# Patient Record
Sex: FEMALE | Race: WHITE | NOT HISPANIC OR LATINO | Employment: OTHER | URBAN - METROPOLITAN AREA
[De-identification: names, ages, dates, MRNs, and addresses within clinical notes are randomized per-mention and may not be internally consistent; named-entity substitution may affect disease eponyms.]

---

## 2017-03-10 ENCOUNTER — TELEPHONE (OUTPATIENT)
Dept: CARDIOLOGY | Facility: MEDICAL CENTER | Age: 67
End: 2017-03-10

## 2017-03-10 NOTE — TELEPHONE ENCOUNTER
Received letter from [facility] stating that patient is receiving refills for Atorvastatin and Pravastatin.    Called patient, she states that she is only taking Atorvastatin.    Called [facility] and verified that Pravastatin prescription was discontinued.    SANTOS MONTILLA

## 2017-06-01 ENCOUNTER — OFFICE VISIT (OUTPATIENT)
Dept: CARDIOLOGY | Facility: CLINIC | Age: 67
End: 2017-06-01
Payer: MEDICARE

## 2017-06-01 VITALS
WEIGHT: 80 LBS | HEIGHT: 63 IN | SYSTOLIC BLOOD PRESSURE: 90 MMHG | BODY MASS INDEX: 14.18 KG/M2 | DIASTOLIC BLOOD PRESSURE: 60 MMHG | HEART RATE: 89 BPM | OXYGEN SATURATION: 89 %

## 2017-06-01 DIAGNOSIS — I25.5 ISCHEMIC CARDIOMYOPATHY: Chronic | ICD-10-CM

## 2017-06-01 DIAGNOSIS — R06.02 SHORTNESS OF BREATH: ICD-10-CM

## 2017-06-01 DIAGNOSIS — I25.84 CORONARY ARTERY DISEASE DUE TO CALCIFIED CORONARY LESION: ICD-10-CM

## 2017-06-01 DIAGNOSIS — E78.5 DYSLIPIDEMIA: Chronic | ICD-10-CM

## 2017-06-01 DIAGNOSIS — I25.10 CORONARY ARTERY DISEASE DUE TO CALCIFIED CORONARY LESION: ICD-10-CM

## 2017-06-01 PROCEDURE — G8419 CALC BMI OUT NRM PARAM NOF/U: HCPCS | Performed by: INTERNAL MEDICINE

## 2017-06-01 PROCEDURE — 3014F SCREEN MAMMO DOC REV: CPT | Mod: 8P | Performed by: INTERNAL MEDICINE

## 2017-06-01 PROCEDURE — 1036F TOBACCO NON-USER: CPT | Performed by: INTERNAL MEDICINE

## 2017-06-01 PROCEDURE — 1101F PT FALLS ASSESS-DOCD LE1/YR: CPT | Mod: 8P | Performed by: INTERNAL MEDICINE

## 2017-06-01 PROCEDURE — 4040F PNEUMOC VAC/ADMIN/RCVD: CPT | Mod: 8P | Performed by: INTERNAL MEDICINE

## 2017-06-01 PROCEDURE — 3017F COLORECTAL CA SCREEN DOC REV: CPT | Mod: 8P | Performed by: INTERNAL MEDICINE

## 2017-06-01 PROCEDURE — G8432 DEP SCR NOT DOC, RNG: HCPCS | Performed by: INTERNAL MEDICINE

## 2017-06-01 PROCEDURE — G8598 ASA/ANTIPLAT THER USED: HCPCS | Performed by: INTERNAL MEDICINE

## 2017-06-01 PROCEDURE — 99214 OFFICE O/P EST MOD 30 MIN: CPT | Performed by: INTERNAL MEDICINE

## 2017-06-01 NOTE — Clinical Note
[facility] [address] [place] [address]  Phone: [phone]  Fax: [phone]              Aditi Merlos  1950    Encounter Date: 6/1/2017    Darnell Browning M.D.          PROGRESS NOTE:  Subjective:   Aditi Merlos is a 66 y.o. female who presents today for followup of her coronary disease with an ischemic cardiomyopathy which is mild. She did have a prior myocardial infarction with subsequent percutaneous intervention to the diagonal. She also has hyperlipidemia. She previously smoked but has quit this habit. However, she is exposed to significant secondhand smoke.    She has been under significant life stress and has not been walking regularly. She was previously able to walk for 15-20 minutes without dyspnea, she was walking uphill. She now has dyspnea on exertion at about a half a block.    She has noted no PND, orthopnea or edema. She's had no chest discomfort, palpitations or lightheadedness.      Past Medical History   Diagnosis Date   • Ischemic cardiomyopathy    • Anterior myocardial infarction (CMS-[facility])    • COPD (chronic obstructive pulmonary disease) (CMS-[facility])    • Dyslipidemia    • Illiteracy    • Tobacco abuse    • CAD (coronary artery disease) 3/27/2013     HX PTCA OF DIAGONAL     Past Surgical History   Procedure Laterality Date   • Appendectomy       Family History   Problem Relation Age of Onset   • Heart Disease Father      History   Smoking status   • Former Smoker -- 3.00 packs/day for 40 years   • Types: Cigarettes   • Quit date: 06/23/2008   Smokeless tobacco   • Never Used     No Known Allergies  Outpatient Encounter Prescriptions as of 6/1/2017   Medication Sig Dispense Refill   • Albuterol Sulfate (PROAIR HFA INH) Inhale  by mouth.     • metoprolol SR (TOPROL XL) 50 MG TABLET SR 24 HR Take 1.5 Tabs by mouth every day. 45 Tab 11   • lisinopril (PRINIVIL) 20 MG Tab Take 1 Tab by mouth every day. 30 Tab 11    "  • atorvastatin (LIPITOR) 40 MG Tab take 1 tablet by mouth once daily 30 Tab 11   • alendronate (FOSAMAX) 70 MG Tab Take  by mouth.  0   • hydrocodone-acetaminophen (NORCO) 5-325 MG TABS per tablet Take 1-2 Tabs by mouth as needed.     • aspirin (ASA) 325 MG TABS Take 325 mg by mouth every day. Take one aspirin daily        No facility-administered encounter medications on file as of 6/1/2017.     ROS       Objective:   BP 90/60 mmHg  Pulse 89  Ht 1.6 m (5' 2.99\")  Wt 36.288 kg (80 lb)  BMI 14.18 kg/m2  SpO2 89%    Physical Exam   Neck: No JVD present.   Cardiovascular: Regular rhythm.  Exam reveals no gallop.    No murmur heard.  Pulmonary/Chest: Effort normal. She has no rales.   Abdominal: Soft. There is no tenderness.   Musculoskeletal: She exhibits no edema.     Laboratory from June 13: CMP is within normal limits. Total cholesterol 145, HDL 64, LDL 64 and triglycerides 85.    Echocardiogram from November 20:    This shows normal overall systolic left ventricular function with an ejection fraction of approximately 55%. There was akinesis of the apical anterior and apical lateral and apical walls. Grade 1 diastolic dysfunction was normal. Patient's estimated respiratory systolic pressure was moderately elevated at 45 mmHg.    Assessment:     1. Coronary artery disease due to calcified coronary lesion     2. Dyslipidemia     3. Ischemic cardiomyopathy         Medical Decision Making:  Today's Assessment / Status / Plan:     Ms. Merlos Is having difficulty with dyspnea. She's had a significant fall in her exercise tolerance. I feel she should be evaluated with a BNP. In addition, I would like her to get back into a regular walking program. Her lipid status has been under good control. She will have a repeat lipid and CMP with her BNP. She will follow-up in a few months.      DIAMOND Jose.  [address] [place] [address]  VIA Facsimile: [phone]                   "

## 2017-06-01 NOTE — PROGRESS NOTES
Subjective:   Aditi Merlos is a 66 y.o. female who presents today for followup of her coronary disease with an ischemic cardiomyopathy which is mild. She did have a prior myocardial infarction with subsequent percutaneous intervention to the diagonal. She also has hyperlipidemia. She previously smoked but has quit this habit. However, she is exposed to significant secondhand smoke.    She has been under significant life stress and has not been walking regularly. She was previously able to walk for 15-20 minutes without dyspnea, she was walking uphill. She now has dyspnea on exertion at about a half a block.    She has noted no PND, orthopnea or edema. She's had no chest discomfort, palpitations or lightheadedness.      Past Medical History   Diagnosis Date   • Ischemic cardiomyopathy    • Anterior myocardial infarction (CMS-HCC)    • COPD (chronic obstructive pulmonary disease) (CMS-[facility])    • Dyslipidemia    • Illiteracy    • Tobacco abuse    • CAD (coronary artery disease) 3/27/2013     HX PTCA OF DIAGONAL     Past Surgical History   Procedure Laterality Date   • Appendectomy       Family History   Problem Relation Age of Onset   • Heart Disease Father      History   Smoking status   • Former Smoker -- 3.00 packs/day for 40 years   • Types: Cigarettes   • Quit date: 06/23/2008   Smokeless tobacco   • Never Used     No Known Allergies  Outpatient Encounter Prescriptions as of 6/1/2017   Medication Sig Dispense Refill   • Albuterol Sulfate (PROAIR HFA INH) Inhale  by mouth.     • metoprolol SR (TOPROL XL) 50 MG TABLET SR 24 HR Take 1.5 Tabs by mouth every day. 45 Tab 11   • lisinopril (PRINIVIL) 20 MG Tab Take 1 Tab by mouth every day. 30 Tab 11   • atorvastatin (LIPITOR) 40 MG Tab take 1 tablet by mouth once daily 30 Tab 11   • alendronate (FOSAMAX) 70 MG Tab Take  by mouth.  0   • hydrocodone-acetaminophen (NORCO) 5-325 MG TABS per tablet Take 1-2 Tabs by mouth as needed.     • aspirin (ASA) 325 MG TABS Take 325  "mg by mouth every day. Take one aspirin daily        No facility-administered encounter medications on file as of 6/1/2017.     ROS       Objective:   BP 90/60 mmHg  Pulse 89  Ht 1.6 m (5' 2.99\")  Wt 36.288 kg (80 lb)  BMI 14.18 kg/m2  SpO2 89%    Physical Exam   Neck: No JVD present.   Cardiovascular: Regular rhythm.  Exam reveals no gallop.    No murmur heard.  Pulmonary/Chest: Effort normal. She has no rales.   Abdominal: Soft. There is no tenderness.   Musculoskeletal: She exhibits no edema.     Laboratory from June 13: CMP is within normal limits. Total cholesterol 145, HDL 64, LDL 64 and triglycerides 85.    Echocardiogram from November 20:    This shows normal overall systolic left ventricular function with an ejection fraction of approximately 55%. There was akinesis of the apical anterior and apical lateral and apical walls. Grade 1 diastolic dysfunction was normal. Patient's estimated respiratory systolic pressure was moderately elevated at 45 mmHg.    Assessment:     1. Coronary artery disease due to calcified coronary lesion     2. Dyslipidemia     3. Ischemic cardiomyopathy         Medical Decision Making:  Today's Assessment / Status / Plan:     Ms. Merlos Is having difficulty with dyspnea. She's had a significant fall in her exercise tolerance. I feel she should be evaluated with a BNP. In addition, I would like her to get back into a regular walking program. Her lipid status has been under good control. She will have a repeat lipid and CMP with her BNP. She will follow-up in a few months.  "

## 2017-06-07 DIAGNOSIS — E78.5 DYSLIPIDEMIA: Chronic | ICD-10-CM

## 2017-06-07 DIAGNOSIS — R06.02 SHORTNESS OF BREATH: ICD-10-CM

## 2017-06-07 DIAGNOSIS — I25.84 CORONARY ARTERY DISEASE DUE TO CALCIFIED CORONARY LESION: ICD-10-CM

## 2017-06-07 DIAGNOSIS — I25.10 CORONARY ARTERY DISEASE DUE TO CALCIFIED CORONARY LESION: ICD-10-CM

## 2017-10-16 DIAGNOSIS — E78.5 DYSLIPIDEMIA: ICD-10-CM

## 2017-10-16 RX ORDER — ATORVASTATIN CALCIUM 40 MG/1
40 TABLET, FILM COATED ORAL DAILY
Qty: 90 TAB | Refills: 2 | OUTPATIENT
Start: 2017-10-16 | End: 2018-07-03 | Stop reason: SDUPTHER

## 2017-10-26 ENCOUNTER — OFFICE VISIT (OUTPATIENT)
Dept: CARDIOLOGY | Facility: CLINIC | Age: 67
End: 2017-10-26
Payer: MEDICARE

## 2017-10-26 VITALS
WEIGHT: 88 LBS | SYSTOLIC BLOOD PRESSURE: 132 MMHG | BODY MASS INDEX: 16.2 KG/M2 | OXYGEN SATURATION: 81 % | HEART RATE: 88 BPM | HEIGHT: 62 IN | DIASTOLIC BLOOD PRESSURE: 70 MMHG

## 2017-10-26 DIAGNOSIS — R06.09 DYSPNEA ON EXERTION: ICD-10-CM

## 2017-10-26 DIAGNOSIS — I25.10 CORONARY ARTERY DISEASE DUE TO CALCIFIED CORONARY LESION: ICD-10-CM

## 2017-10-26 DIAGNOSIS — I25.5 ISCHEMIC CARDIOMYOPATHY: Chronic | ICD-10-CM

## 2017-10-26 DIAGNOSIS — E78.5 DYSLIPIDEMIA: Chronic | ICD-10-CM

## 2017-10-26 DIAGNOSIS — I25.84 CORONARY ARTERY DISEASE DUE TO CALCIFIED CORONARY LESION: ICD-10-CM

## 2017-10-26 PROCEDURE — 99214 OFFICE O/P EST MOD 30 MIN: CPT | Performed by: INTERNAL MEDICINE

## 2017-10-26 RX ORDER — FUROSEMIDE 20 MG/1
20 TABLET ORAL 2 TIMES DAILY
Qty: 60 TAB | Refills: 11 | Status: SHIPPED | OUTPATIENT
Start: 2017-10-26

## 2017-10-26 RX ORDER — POTASSIUM CHLORIDE 20 MEQ/1
20 TABLET, EXTENDED RELEASE ORAL 2 TIMES DAILY
Qty: 60 TAB | Refills: 11 | Status: SHIPPED | OUTPATIENT
Start: 2017-10-26
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
## 2017-11-09 ENCOUNTER — OFFICE VISIT (OUTPATIENT)
Dept: CARDIOLOGY | Facility: CLINIC | Age: 67
End: 2017-11-09
Payer: MEDICARE

## 2017-11-09 VITALS
SYSTOLIC BLOOD PRESSURE: 110 MMHG | BODY MASS INDEX: 15.09 KG/M2 | WEIGHT: 82 LBS | HEART RATE: 86 BPM | HEIGHT: 62 IN | DIASTOLIC BLOOD PRESSURE: 62 MMHG

## 2017-11-09 DIAGNOSIS — J43.9 PULMONARY EMPHYSEMA, UNSPECIFIED EMPHYSEMA TYPE (HCC): ICD-10-CM

## 2017-11-09 DIAGNOSIS — I27.81 COR PULMONALE (HCC): ICD-10-CM

## 2017-11-09 DIAGNOSIS — I25.10 CORONARY ARTERY DISEASE DUE TO CALCIFIED CORONARY LESION: ICD-10-CM

## 2017-11-09 DIAGNOSIS — I25.5 ISCHEMIC CARDIOMYOPATHY: Chronic | ICD-10-CM

## 2017-11-09 DIAGNOSIS — R06.09 DYSPNEA ON EXERTION: ICD-10-CM

## 2017-11-09 DIAGNOSIS — I25.84 CORONARY ARTERY DISEASE DUE TO CALCIFIED CORONARY LESION: ICD-10-CM

## 2017-11-09 PROCEDURE — 99214 OFFICE O/P EST MOD 30 MIN: CPT | Performed by: INTERNAL MEDICINE

## 2017-11-09 ASSESSMENT — ENCOUNTER SYMPTOMS
WHEEZING: 1
SHORTNESS OF BREATH: 1

## 2017-11-09 NOTE — LETTER
[facility] [address] [place] [address]  Phone: [phone]  Fax: [phone]              Aditi Merlos  1950    Encounter Date: 11/9/2017    Ian Guzman M.D.          PROGRESS NOTE:  Subjective:   Aditi Merlos is a 67 y.o. female who presents today In follow-up for congestive heart failure  Chest x-ray: COPD only  Echo: LVEF 55 anterior wall motion abnormality  PA pressure 60, previously 45    CMP:K equal 3.4    Past Medical History:   Diagnosis Date   • Anterior myocardial infarction (CMS-[facility])    • CAD (coronary artery disease) 3/27/2013    HX PTCA OF DIAGONAL   • COPD (chronic obstructive pulmonary disease) (CMS-[facility])    • Dyslipidemia    • Illiteracy    • Ischemic cardiomyopathy    • Tobacco abuse      Past Surgical History:   Procedure Laterality Date   • APPENDECTOMY       Family History   Problem Relation Age of Onset   • Heart Disease Father      History   Smoking Status   • Former Smoker   • Packs/day: 3.00   • Years: 40.00   • Types: Cigarettes   • Quit date: 6/23/2008   Smokeless Tobacco   • Never Used     No Known Allergies  Outpatient Encounter Prescriptions as of 11/9/2017   Medication Sig Dispense Refill   • ipratropium-albuterol (COMBIVENT RESPIMAT)  MCG/ACT Aero Soln Inhale 1 Puff by mouth 4 times a day.     • furosemide (LASIX) 20 MG Tab Take 1 Tab by mouth 2 times a day. 60 Tab 11   • potassium chloride SA (KDUR) 20 MEQ Tab CR Take 1 Tab by mouth 2 times a day. 60 Tab 11   • atorvastatin (LIPITOR) 40 MG Tab Take 1 Tab by mouth every day. 90 Tab 2   • Albuterol Sulfate (PROAIR HFA INH) Inhale  by mouth.     • metoprolol SR (TOPROL XL) 50 MG TABLET SR 24 HR Take 1.5 Tabs by mouth every day. 45 Tab 11   • lisinopril (PRINIVIL) 20 MG Tab Take 1 Tab by mouth every day. 30 Tab 11   • alendronate (FOSAMAX) 70 MG Tab Take  by mouth.  0   • hydrocodone-acetaminophen (NORCO) 5-325 MG TABS per tablet Take  "1-2 Tabs by mouth as needed.     • aspirin (ASA) 325 MG TABS Take 325 mg by mouth every day. Take one aspirin daily        No facility-administered encounter medications on file as of 11/9/2017.      Review of Systems   Respiratory: Positive for shortness of breath and wheezing.    Cardiovascular: Positive for leg swelling.        Objective:   /62   Pulse 86   Ht 1.575 m (5' 2\")   Wt 37.2 kg (82 lb)   BMI 15.00 kg/m²      Physical Exam   Constitutional: She is oriented to person, place, and time.   Neck: No JVD present.   Cardiovascular: Normal rate, regular rhythm and normal heart sounds.  Exam reveals no gallop and no friction rub.    No murmur heard.  Pulmonary/Chest:   Distant breath sounds bilateral  mild tachypnea   Musculoskeletal: She exhibits edema.   Trace pedal edema   Neurological: She is alert and oriented to person, place, and time.   Skin: Skin is warm and dry.       Assessment:     1. Ischemic cardiomyopathy  DX-CHEST-2 VIEWS    BTYPE NATRIURETIC PEPTIDE    BASIC METABOLIC PANEL   2. Dyspnea on exertion  DX-CHEST-2 VIEWS    BTYPE NATRIURETIC PEPTIDE    BASIC METABOLIC PANEL    CBC WITH DIFFERENTIAL   3. Coronary artery disease due to calcified coronary lesion  BASIC METABOLIC PANEL   4. Pulmonary emphysema, unspecified emphysema type (CMS-HCC)     5. Cor pulmonale (CMS-[facility])         Medical Decision Making:  Today's Assessment / Status / Plan:   Cor pulmonale secondary to COPD  Compensated with Lasix  Very likely needs oxygen 24/7  No med changes  Return 6 months      Bren Elaine, PDillonA.  [address] [place] [address]  VIA Facsimile: [phone]                 "

## 2017-11-09 NOTE — PROGRESS NOTES
Subjective:   Aditi Merlos is a 67 y.o. female who presents today In follow-up for congestive heart failure  Chest x-ray: COPD only  Echo: LVEF 55 anterior wall motion abnormality  PA pressure 60, previously 45    CMP:K equal 3.4    Past Medical History:   Diagnosis Date   • Anterior myocardial infarction (CMS-HCC)    • CAD (coronary artery disease) 3/27/2013    HX PTCA OF DIAGONAL   • COPD (chronic obstructive pulmonary disease) (CMS-HCC)    • Dyslipidemia    • Illiteracy    • Ischemic cardiomyopathy    • Tobacco abuse      Past Surgical History:   Procedure Laterality Date   • APPENDECTOMY       Family History   Problem Relation Age of Onset   • Heart Disease Father      History   Smoking Status   • Former Smoker   • Packs/day: 3.00   • Years: 40.00   • Types: Cigarettes   • Quit date: 6/23/2008   Smokeless Tobacco   • Never Used     No Known Allergies  Outpatient Encounter Prescriptions as of 11/9/2017   Medication Sig Dispense Refill   • ipratropium-albuterol (COMBIVENT RESPIMAT)  MCG/ACT Aero Soln Inhale 1 Puff by mouth 4 times a day.     • furosemide (LASIX) 20 MG Tab Take 1 Tab by mouth 2 times a day. 60 Tab 11   • potassium chloride SA (KDUR) 20 MEQ Tab CR Take 1 Tab by mouth 2 times a day. 60 Tab 11   • atorvastatin (LIPITOR) 40 MG Tab Take 1 Tab by mouth every day. 90 Tab 2   • Albuterol Sulfate (PROAIR HFA INH) Inhale  by mouth.     • metoprolol SR (TOPROL XL) 50 MG TABLET SR 24 HR Take 1.5 Tabs by mouth every day. 45 Tab 11   • lisinopril (PRINIVIL) 20 MG Tab Take 1 Tab by mouth every day. 30 Tab 11   • alendronate (FOSAMAX) 70 MG Tab Take  by mouth.  0   • hydrocodone-acetaminophen (NORCO) 5-325 MG TABS per tablet Take 1-2 Tabs by mouth as needed.     • aspirin (ASA) 325 MG TABS Take 325 mg by mouth every day. Take one aspirin daily        No facility-administered encounter medications on file as of 11/9/2017.      Review of Systems   Respiratory: Positive for shortness of breath and  "wheezing.    Cardiovascular: Positive for leg swelling.        Objective:   /62   Pulse 86   Ht 1.575 m (5' 2\")   Wt 37.2 kg (82 lb)   BMI 15.00 kg/m²     Physical Exam   Constitutional: She is oriented to person, place, and time.   Neck: No JVD present.   Cardiovascular: Normal rate, regular rhythm and normal heart sounds.  Exam reveals no gallop and no friction rub.    No murmur heard.  Pulmonary/Chest:   Distant breath sounds bilateral  mild tachypnea   Musculoskeletal: She exhibits edema.   Trace pedal edema   Neurological: She is alert and oriented to person, place, and time.   Skin: Skin is warm and dry.       Assessment:     1. Ischemic cardiomyopathy  DX-CHEST-2 VIEWS    BTYPE NATRIURETIC PEPTIDE    BASIC METABOLIC PANEL   2. Dyspnea on exertion  DX-CHEST-2 VIEWS    BTYPE NATRIURETIC PEPTIDE    BASIC METABOLIC PANEL    CBC WITH DIFFERENTIAL   3. Coronary artery disease due to calcified coronary lesion  BASIC METABOLIC PANEL   4. Pulmonary emphysema, unspecified emphysema type (CMS-HCC)     5. Cor pulmonale (CMS-[facility])         Medical Decision Making:  Today's Assessment / Status / Plan:   Cor pulmonale secondary to COPD  Compensated with Lasix  Very likely needs oxygen 24/7  No med changes  Return 6 months  "

## 2017-11-17 DIAGNOSIS — I25.5 ISCHEMIC CARDIOMYOPATHY: Chronic | ICD-10-CM

## 2017-11-18 RX ORDER — LISINOPRIL 20 MG/1
TABLET ORAL
Qty: 30 TAB | Refills: 11 | Status: SHIPPED | OUTPATIENT
Start: 2017-11-18 | End: 2018-10-30 | Stop reason: SDUPTHER

## 2018-06-28 ENCOUNTER — OFFICE VISIT (OUTPATIENT)
Dept: CARDIOLOGY | Facility: CLINIC | Age: 68
End: 2018-06-28
Payer: MEDICARE

## 2018-06-28 VITALS
DIASTOLIC BLOOD PRESSURE: 58 MMHG | WEIGHT: 82 LBS | SYSTOLIC BLOOD PRESSURE: 96 MMHG | BODY MASS INDEX: 15.09 KG/M2 | HEIGHT: 62 IN | HEART RATE: 84 BPM | OXYGEN SATURATION: 87 %

## 2018-06-28 DIAGNOSIS — E78.5 DYSLIPIDEMIA: Chronic | ICD-10-CM

## 2018-06-28 DIAGNOSIS — I25.5 ISCHEMIC CARDIOMYOPATHY: Chronic | ICD-10-CM

## 2018-06-28 DIAGNOSIS — R06.09 DYSPNEA ON EXERTION: ICD-10-CM

## 2018-06-28 DIAGNOSIS — I25.84 CORONARY ARTERY DISEASE DUE TO CALCIFIED CORONARY LESION: ICD-10-CM

## 2018-06-28 DIAGNOSIS — I25.10 CORONARY ARTERY DISEASE DUE TO CALCIFIED CORONARY LESION: ICD-10-CM

## 2018-06-28 PROCEDURE — 99214 OFFICE O/P EST MOD 30 MIN: CPT | Performed by: INTERNAL MEDICINE

## 2018-06-28 RX ORDER — PREDNISONE 20 MG/1
20 TABLET ORAL DAILY
COMMUNITY

## 2018-06-28 NOTE — PROGRESS NOTES
Chief Complaint   Patient presents with   • Coronary Artery Disease       Subjective:   Aditi Merlos is a 67 y.o. female who presents today for followup of her coronary disease with an ischemic cardiomyopathy which is mild. She did have a prior myocardial infarction in about 2010 with subsequent percutaneous intervention to the diagonal. She also has hyperlipidemia. She previously smoked but has quit this habit. However, she is exposed to significant secondhand smoke.    She was in the emergency room about 6 a days ago for dyspnea and a bronchitis.  At that time, she has significant EKG abnormalities with T wave inversion anteriorly.  It was felt that this might be new.  Unfortunately, we did not have an old EKG for review.  She had no associated chest discomfort. And, her troponins were negative.    She did have some difficulty with lightheadedness which resolved after she reduced her diuretic therapy to 1 tablet daily.  She has dyspnea on exertion at less than 20-30 feet.  She is on oxygen 24/7 and has no dyspnea at night as long as she is on oxygen therapy.  She is noted no edema, palpitations or chest discomfort.                Past Medical History:   Diagnosis Date   • Anterior myocardial infarction (HCC)    • CAD (coronary artery disease) 3/27/2013    HX PTCA OF DIAGONAL   • COPD (chronic obstructive pulmonary disease) (HCC)    • Dyslipidemia    • Illiteracy    • Ischemic cardiomyopathy    • Tobacco abuse      Past Surgical History:   Procedure Laterality Date   • APPENDECTOMY       Family History   Problem Relation Age of Onset   • Heart Disease Father      Social History     Social History   • Marital status:      Spouse name: N/A   • Number of children: N/A   • Years of education: N/A     Occupational History   • Not on file.     Social History Main Topics   • Smoking status: Former Smoker     Packs/day: 3.00     Years: 40.00     Types: Cigarettes     Quit date: 6/23/2008   • Smokeless tobacco:  "Never Used   • Alcohol use No   • Drug use: Unknown   • Sexual activity: Not on file     Other Topics Concern   • Not on file     Social History Narrative   • No narrative on file     No Known Allergies  Outpatient Encounter Prescriptions as of 6/28/2018   Medication Sig Dispense Refill   • predniSONE (DELTASONE) 20 MG Tab Take 20 mg by mouth every day.     • metoprolol SR (TOPROL XL) 50 MG TABLET SR 24 HR Take 1.5 Tabs by mouth every day. 45 Tab 11   • lisinopril (PRINIVIL) 20 MG Tab take 1 tablet by mouth once daily 30 Tab 11   • ipratropium-albuterol (COMBIVENT RESPIMAT)  MCG/ACT Aero Soln Inhale 1 Puff by mouth 4 times a day.     • furosemide (LASIX) 20 MG Tab Take 1 Tab by mouth 2 times a day. 60 Tab 11   • potassium chloride SA (KDUR) 20 MEQ Tab CR Take 1 Tab by mouth 2 times a day. 60 Tab 11   • atorvastatin (LIPITOR) 40 MG Tab Take 1 Tab by mouth every day. 90 Tab 2   • Albuterol Sulfate (PROAIR HFA INH) Inhale  by mouth.     • hydrocodone-acetaminophen (NORCO) 5-325 MG TABS per tablet Take 1-2 Tabs by mouth as needed.     • aspirin (ASA) 325 MG TABS Take 325 mg by mouth every day. Take one aspirin daily      • alendronate (FOSAMAX) 70 MG Tab Take  by mouth.  0     No facility-administered encounter medications on file as of 6/28/2018.      ROS     Objective:   BP (!) 96/58   Pulse 84   Ht 1.575 m (5' 2\")   Wt 37.2 kg (82 lb)   SpO2 (!) 87%   BMI 15.00 kg/m²     Physical Exam   Constitutional: She appears well-developed and well-nourished.   Neck: No JVD present.   Cardiovascular: Normal rate and regular rhythm.    No murmur heard.  Pulmonary/Chest: Effort normal and breath sounds normal. She has no rales.   Abdominal: Soft. There is no tenderness.   Musculoskeletal: She exhibits no edema.     Views from June 22 reviewed by myself: These show an anterior infarction with T wave inversion in V3 through V5 and loss of R wave from V1 through V4.    Assessment:     1. Coronary artery disease due to " calcified coronary lesion     2. Ischemic cardiomyopathy     3. Dyslipidemia     4. Dyspnea on exertion         Medical Decision Making:  Today's Assessment / Status / Plan:     Ms. Merlos is clinically stable though she does have chronic difficulty with dyspnea from her underlying COPD.  She did have lab work at the hospital last week and will obtain that for review.  Given her abnormal EKGs, we will have her undergo myocardial perfusion imaging to reevaluate for ischemia.  She does have a remote history of a diagonal stent.  She will follow-up in about a month.  If she has not had a lipid panel, one will be obtained.

## 2018-07-03 DIAGNOSIS — E78.5 DYSLIPIDEMIA: ICD-10-CM

## 2018-07-04 RX ORDER — ATORVASTATIN CALCIUM 40 MG/1
TABLET, FILM COATED ORAL
Qty: 90 TAB | Refills: 0 | Status: SHIPPED | OUTPATIENT
Start: 2018-07-04 | End: 2019-03-16 | Stop reason: SDUPTHER

## 2018-08-06 ENCOUNTER — HOSPITAL ENCOUNTER (OUTPATIENT)
Dept: RADIOLOGY | Facility: MEDICAL CENTER | Age: 68
End: 2018-08-06

## 2018-08-09 ENCOUNTER — TELEPHONE (OUTPATIENT)
Dept: CARDIOLOGY | Facility: MEDICAL CENTER | Age: 68
End: 2018-08-09

## 2018-08-09 NOTE — TELEPHONE ENCOUNTER
Pt called per FK request.    Pt to be seen at noon in [place] on 8/16.    Pt states she has not been out to do her labs yet secondary to the air quality. Pt reassured that it was OK. To try to do so before 8/16 but if unable that was OK but attending this appointment was important and if needed to reserve her energies for that. She states understanding and appt date/time confirmed.

## 2018-08-16 ENCOUNTER — OFFICE VISIT (OUTPATIENT)
Dept: CARDIOLOGY | Facility: CLINIC | Age: 68
End: 2018-08-16
Payer: MEDICARE

## 2018-08-16 VITALS
DIASTOLIC BLOOD PRESSURE: 56 MMHG | OXYGEN SATURATION: 87 % | HEART RATE: 88 BPM | HEIGHT: 63 IN | SYSTOLIC BLOOD PRESSURE: 91 MMHG

## 2018-08-16 DIAGNOSIS — R06.09 DYSPNEA ON EXERTION: ICD-10-CM

## 2018-08-16 DIAGNOSIS — I25.10 CORONARY ARTERY DISEASE DUE TO CALCIFIED CORONARY LESION: ICD-10-CM

## 2018-08-16 DIAGNOSIS — I25.84 CORONARY ARTERY DISEASE DUE TO CALCIFIED CORONARY LESION: ICD-10-CM

## 2018-08-16 DIAGNOSIS — E78.5 DYSLIPIDEMIA: Chronic | ICD-10-CM

## 2018-08-16 DIAGNOSIS — I25.5 ISCHEMIC CARDIOMYOPATHY: Chronic | ICD-10-CM

## 2018-08-16 PROCEDURE — 99214 OFFICE O/P EST MOD 30 MIN: CPT | Performed by: INTERNAL MEDICINE

## 2018-08-16 NOTE — LETTER
[facility] [address] [place] [address]  Phone: [phone]  Fax: [phone]              Aditi Merlos  1950    Encounter Date: 8/16/2018    Darnell Browning M.D.          PROGRESS NOTE:  Chief Complaint   Patient presents with   • Shortness of Breath       Subjective:   Aditi Merlos is a 67 y.o. female who presents today for followup of her coronary disease with an ischemic cardiomyopathy which is mild. She did have a prior myocardial infarction in about 2010 with subsequent percutaneous intervention to the diagonal. She also has hyperlipidemia. She previously smoked but has quit this habit. However, she is exposed to significant secondhand smoke.  She underwent evaluation with a myocardial perfusion scan and is here for follow-up today.    She denies any chest discomfort, edema, palpitations or lightheadedness.  She has had no PND orthopnea but is on oxygen therapy 24/7 .  She does have dyspnea on exertion with minimal activity.    Past Medical History:   Diagnosis Date   • Anterior myocardial infarction (HCC)    • CAD (coronary artery disease) 3/27/2013    HX PTCA OF DIAGONAL   • COPD (chronic obstructive pulmonary disease) (HCC)    • Dyslipidemia    • Illiteracy    • Ischemic cardiomyopathy    • Tobacco abuse      Past Surgical History:   Procedure Laterality Date   • APPENDECTOMY       Family History   Problem Relation Age of Onset   • Heart Disease Father      Social History     Social History   • Marital status:      Spouse name: N/A   • Number of children: N/A   • Years of education: N/A     Occupational History   • Not on file.     Social History Main Topics   • Smoking status: Former Smoker     Packs/day: 3.00     Years: 40.00     Types: Cigarettes     Quit date: 6/23/2008   • Smokeless tobacco: Never Used   • Alcohol use No   • Drug use: Unknown   • Sexual activity: Not on file     Other Topics Concern   • Not on  "file     Social History Narrative   • No narrative on file     No Known Allergies  Outpatient Encounter Prescriptions as of 8/16/2018   Medication Sig Dispense Refill   • atorvastatin (LIPITOR) 40 MG Tab take 1 tablet by mouth once daily 90 Tab 0   • predniSONE (DELTASONE) 20 MG Tab Take 20 mg by mouth every day.     • metoprolol SR (TOPROL XL) 50 MG TABLET SR 24 HR Take 1.5 Tabs by mouth every day. 45 Tab 11   • lisinopril (PRINIVIL) 20 MG Tab take 1 tablet by mouth once daily 30 Tab 11   • ipratropium-albuterol (COMBIVENT RESPIMAT)  MCG/ACT Aero Soln Inhale 1 Puff by mouth 4 times a day.     • furosemide (LASIX) 20 MG Tab Take 1 Tab by mouth 2 times a day. 60 Tab 11   • potassium chloride SA (KDUR) 20 MEQ Tab CR Take 1 Tab by mouth 2 times a day. 60 Tab 11   • Albuterol Sulfate (PROAIR HFA INH) Inhale  by mouth.     • alendronate (FOSAMAX) 70 MG Tab Take  by mouth.  0   • hydrocodone-acetaminophen (NORCO) 5-325 MG TABS per tablet Take 1-2 Tabs by mouth as needed.     • aspirin (ASA) 325 MG TABS Take 325 mg by mouth every day. Take one aspirin daily        No facility-administered encounter medications on file as of 8/16/2018.      ROS     Objective:   BP (!) 91/56   Pulse 88   Ht 1.6 m (5' 3\")   SpO2 (!) 87%     Physical Exam   Constitutional: She appears well-developed and well-nourished.   Neck: No JVD present.   Cardiovascular: Normal rate and regular rhythm.    No murmur heard.  Pulmonary/Chest: Effort normal and breath sounds normal. She has no rales.   Abdominal: Soft. There is no tenderness.   Musculoskeletal: She exhibits no edema.     Myocardial perfusion scan: This showed a large area of anterior and anterior apical infarction.  There was no evidence of ischemia.    Assessment:     1. Ischemic cardiomyopathy     2. Coronary artery disease due to calcified coronary lesion     3. Dyslipidemia     4. Dyspnea on exertion           Medical Decision Making:  Today's Assessment / Status / Plan:     Ms. " Gaurang is clinically stable though she does have difficulty with severe COPD and oxygen dependence.  Her perfusion study shows a large fixed defect which seems somewhat out of proportion to her left ventricular ejection fraction and echocardiogram.  Her echocardiogram did show a apical wall motion abnormality.  Given the fact that she is clinically stable and he is not having any difficulty with chest discomfort, we will continue on medical therapy for now.  Her blood pressure is low and she has no edema.  She will reduce furosemide to 20 mg every other day.  If she has no increase in edema, she will try discontinuing this medication.  She will follow-up in a few months with a repeat echocardiogram.         DIAMOND Jose.  [address] [place] [address]  VIA Facsimile: [phone]

## 2018-08-16 NOTE — LETTER
[facility] [address] [place] [address]  Phone: [phone]  Fax: [phone]              Aditi Merlos  1950    Encounter Date: 8/16/2018    Darnell Browning M.D.          PROGRESS NOTE:  Chief Complaint   Patient presents with   • Shortness of Breath       Subjective:   Aditi Merlos is a 67 y.o. female who presents today for followup of her coronary disease with an ischemic cardiomyopathy which is mild. She did have a prior myocardial infarction in about 2010 with subsequent percutaneous intervention to the diagonal. She also has hyperlipidemia. She previously smoked but has quit this habit. However, she is exposed to significant secondhand smoke.  She underwent evaluation with a myocardial perfusion scan and is here for follow-up today.    She denies any chest discomfort, edema, palpitations or lightheadedness.  She has had no PND orthopnea but is on oxygen therapy 24/7 .  She does have dyspnea on exertion with minimal activity.    Past Medical History:   Diagnosis Date   • Anterior myocardial infarction (HCC)    • CAD (coronary artery disease) 3/27/2013    HX PTCA OF DIAGONAL   • COPD (chronic obstructive pulmonary disease) (HCC)    • Dyslipidemia    • Illiteracy    • Ischemic cardiomyopathy    • Tobacco abuse      Past Surgical History:   Procedure Laterality Date   • APPENDECTOMY       Family History   Problem Relation Age of Onset   • Heart Disease Father      Social History     Social History   • Marital status:      Spouse name: N/A   • Number of children: N/A   • Years of education: N/A     Occupational History   • Not on file.     Social History Main Topics   • Smoking status: Former Smoker     Packs/day: 3.00     Years: 40.00     Types: Cigarettes     Quit date: 6/23/2008   • Smokeless tobacco: Never Used   • Alcohol use No   • Drug use: Unknown   • Sexual activity: Not on file     Other Topics Concern   • Not on  "file     Social History Narrative   • No narrative on file     No Known Allergies  Outpatient Encounter Prescriptions as of 8/16/2018   Medication Sig Dispense Refill   • atorvastatin (LIPITOR) 40 MG Tab take 1 tablet by mouth once daily 90 Tab 0   • predniSONE (DELTASONE) 20 MG Tab Take 20 mg by mouth every day.     • metoprolol SR (TOPROL XL) 50 MG TABLET SR 24 HR Take 1.5 Tabs by mouth every day. 45 Tab 11   • lisinopril (PRINIVIL) 20 MG Tab take 1 tablet by mouth once daily 30 Tab 11   • ipratropium-albuterol (COMBIVENT RESPIMAT)  MCG/ACT Aero Soln Inhale 1 Puff by mouth 4 times a day.     • furosemide (LASIX) 20 MG Tab Take 1 Tab by mouth 2 times a day. 60 Tab 11   • potassium chloride SA (KDUR) 20 MEQ Tab CR Take 1 Tab by mouth 2 times a day. 60 Tab 11   • Albuterol Sulfate (PROAIR HFA INH) Inhale  by mouth.     • alendronate (FOSAMAX) 70 MG Tab Take  by mouth.  0   • hydrocodone-acetaminophen (NORCO) 5-325 MG TABS per tablet Take 1-2 Tabs by mouth as needed.     • aspirin (ASA) 325 MG TABS Take 325 mg by mouth every day. Take one aspirin daily        No facility-administered encounter medications on file as of 8/16/2018.      ROS     Objective:   BP (!) 91/56   Pulse 88   Ht 1.6 m (5' 3\")   SpO2 (!) 87%     Physical Exam   Constitutional: She appears well-developed and well-nourished.   Neck: No JVD present.   Cardiovascular: Normal rate and regular rhythm.    No murmur heard.  Pulmonary/Chest: Effort normal and breath sounds normal. She has no rales.   Abdominal: Soft. There is no tenderness.   Musculoskeletal: She exhibits no edema.     Myocardial perfusion scan: This showed a large area of anterior and anterior apical infarction.  There was no evidence of ischemia.    Assessment:     1. Ischemic cardiomyopathy     2. Coronary artery disease due to calcified coronary lesion     3. Dyslipidemia     4. Dyspnea on exertion           Medical Decision Making:  Today's Assessment / Status / Plan:     Ms. " Gaurang is clinically stable though she does have difficulty with severe COPD and oxygen dependence.  Her perfusion study shows a large fixed defect which seems somewhat out of proportion to her left ventricular ejection fraction and echocardiogram.  Her echocardiogram did show a apical wall motion abnormality.  Given the fact that she is clinically stable and he is not having any difficulty with chest discomfort, we will continue on medical therapy for now.  Her blood pressure is low and she has no edema.  She will reduce furosemide to 20 mg every other day.  If she has no increase in edema, she will try discontinuing this medication.  She will follow-up in a few months with a repeat echocardiogram.         DIAMOND Jose.  [address] [place] [address]  VIA Facsimile: [phone]

## 2018-08-16 NOTE — PROGRESS NOTES
Chief Complaint   Patient presents with   • Shortness of Breath       Subjective:   Aditi Merlos is a 67 y.o. female who presents today for followup of her coronary disease with an ischemic cardiomyopathy which is mild. She did have a prior myocardial infarction in about 2010 with subsequent percutaneous intervention to the diagonal. She also has hyperlipidemia. She previously smoked but has quit this habit. However, she is exposed to significant secondhand smoke.  She underwent evaluation with a myocardial perfusion scan and is here for follow-up today.    She denies any chest discomfort, edema, palpitations or lightheadedness.  She has had no PND orthopnea but is on oxygen therapy 24/7 .  She does have dyspnea on exertion with minimal activity.    Past Medical History:   Diagnosis Date   • Anterior myocardial infarction (HCC)    • CAD (coronary artery disease) 3/27/2013    HX PTCA OF DIAGONAL   • COPD (chronic obstructive pulmonary disease) (HCC)    • Dyslipidemia    • Illiteracy    • Ischemic cardiomyopathy    • Tobacco abuse      Past Surgical History:   Procedure Laterality Date   • APPENDECTOMY       Family History   Problem Relation Age of Onset   • Heart Disease Father      Social History     Social History   • Marital status:      Spouse name: N/A   • Number of children: N/A   • Years of education: N/A     Occupational History   • Not on file.     Social History Main Topics   • Smoking status: Former Smoker     Packs/day: 3.00     Years: 40.00     Types: Cigarettes     Quit date: 6/23/2008   • Smokeless tobacco: Never Used   • Alcohol use No   • Drug use: Unknown   • Sexual activity: Not on file     Other Topics Concern   • Not on file     Social History Narrative   • No narrative on file     No Known Allergies  Outpatient Encounter Prescriptions as of 8/16/2018   Medication Sig Dispense Refill   • atorvastatin (LIPITOR) 40 MG Tab take 1 tablet by mouth once daily 90 Tab 0   • predniSONE  "(DELTASONE) 20 MG Tab Take 20 mg by mouth every day.     • metoprolol SR (TOPROL XL) 50 MG TABLET SR 24 HR Take 1.5 Tabs by mouth every day. 45 Tab 11   • lisinopril (PRINIVIL) 20 MG Tab take 1 tablet by mouth once daily 30 Tab 11   • ipratropium-albuterol (COMBIVENT RESPIMAT)  MCG/ACT Aero Soln Inhale 1 Puff by mouth 4 times a day.     • furosemide (LASIX) 20 MG Tab Take 1 Tab by mouth 2 times a day. 60 Tab 11   • potassium chloride SA (KDUR) 20 MEQ Tab CR Take 1 Tab by mouth 2 times a day. 60 Tab 11   • Albuterol Sulfate (PROAIR HFA INH) Inhale  by mouth.     • alendronate (FOSAMAX) 70 MG Tab Take  by mouth.  0   • hydrocodone-acetaminophen (NORCO) 5-325 MG TABS per tablet Take 1-2 Tabs by mouth as needed.     • aspirin (ASA) 325 MG TABS Take 325 mg by mouth every day. Take one aspirin daily        No facility-administered encounter medications on file as of 8/16/2018.      ROS     Objective:   BP (!) 91/56   Pulse 88   Ht 1.6 m (5' 3\")   SpO2 (!) 87%     Physical Exam   Constitutional: She appears well-developed and well-nourished.   Neck: No JVD present.   Cardiovascular: Normal rate and regular rhythm.    No murmur heard.  Pulmonary/Chest: Effort normal and breath sounds normal. She has no rales.   Abdominal: Soft. There is no tenderness.   Musculoskeletal: She exhibits no edema.     Myocardial perfusion scan 8/2018: This showed a large area of anterior and anterior apical infarction.  There was no evidence of ischemia.    Assessment:     1. Ischemic cardiomyopathy     2. Coronary artery disease due to calcified coronary lesion     3. Dyslipidemia     4. Dyspnea on exertion           Medical Decision Making:  Today's Assessment / Status / Plan:     Ms. Merlos is clinically stable though she does have difficulty with severe COPD and oxygen dependence.  Her perfusion study shows a large fixed defect which seems somewhat out of proportion to her left ventricular ejection fraction and " echocardiogram,10/2017.  It is possible, that she could have had an interval myocardial infarction.  Her echocardiogram did show a apical wall motion abnormality.  Given the fact that she is clinically stable and is not having any difficulty with chest discomfort, we will continue on medical therapy for now.  Her blood pressure is low and she has no edema.  She will reduce furosemide to 20 mg every other day.  If she has no increase in edema, she will try discontinuing this medication.  She will follow-up in a few months with a repeat echocardiogram.

## 2018-08-16 NOTE — LETTER
[facility] [address] [place] [address]  Phone: [phone]  Fax: [phone]              Aditi Merlos  1950    Encounter Date: 8/16/2018    Darnell Browning M.D.          PROGRESS NOTE:  Chief Complaint   Patient presents with   • Shortness of Breath       Subjective:   Aditi Merlos is a 67 y.o. female who presents today for followup of her coronary disease with an ischemic cardiomyopathy which is mild. She did have a prior myocardial infarction in about 2010 with subsequent percutaneous intervention to the diagonal. She also has hyperlipidemia. She previously smoked but has quit this habit. However, she is exposed to significant secondhand smoke.  She underwent evaluation with a myocardial perfusion scan and is here for follow-up today.    She denies any chest discomfort, edema, palpitations or lightheadedness.  She has had no PND orthopnea but is on oxygen therapy 24/7 .  She does have dyspnea on exertion with minimal activity.    Past Medical History:   Diagnosis Date   • Anterior myocardial infarction (HCC)    • CAD (coronary artery disease) 3/27/2013    HX PTCA OF DIAGONAL   • COPD (chronic obstructive pulmonary disease) (HCC)    • Dyslipidemia    • Illiteracy    • Ischemic cardiomyopathy    • Tobacco abuse      Past Surgical History:   Procedure Laterality Date   • APPENDECTOMY       Family History   Problem Relation Age of Onset   • Heart Disease Father      Social History     Social History   • Marital status:      Spouse name: N/A   • Number of children: N/A   • Years of education: N/A     Occupational History   • Not on file.     Social History Main Topics   • Smoking status: Former Smoker     Packs/day: 3.00     Years: 40.00     Types: Cigarettes     Quit date: 6/23/2008   • Smokeless tobacco: Never Used   • Alcohol use No   • Drug use: Unknown   • Sexual activity: Not on file     Other Topics Concern   • Not on  "file     Social History Narrative   • No narrative on file     No Known Allergies  Outpatient Encounter Prescriptions as of 8/16/2018   Medication Sig Dispense Refill   • atorvastatin (LIPITOR) 40 MG Tab take 1 tablet by mouth once daily 90 Tab 0   • predniSONE (DELTASONE) 20 MG Tab Take 20 mg by mouth every day.     • metoprolol SR (TOPROL XL) 50 MG TABLET SR 24 HR Take 1.5 Tabs by mouth every day. 45 Tab 11   • lisinopril (PRINIVIL) 20 MG Tab take 1 tablet by mouth once daily 30 Tab 11   • ipratropium-albuterol (COMBIVENT RESPIMAT)  MCG/ACT Aero Soln Inhale 1 Puff by mouth 4 times a day.     • furosemide (LASIX) 20 MG Tab Take 1 Tab by mouth 2 times a day. 60 Tab 11   • potassium chloride SA (KDUR) 20 MEQ Tab CR Take 1 Tab by mouth 2 times a day. 60 Tab 11   • Albuterol Sulfate (PROAIR HFA INH) Inhale  by mouth.     • alendronate (FOSAMAX) 70 MG Tab Take  by mouth.  0   • hydrocodone-acetaminophen (NORCO) 5-325 MG TABS per tablet Take 1-2 Tabs by mouth as needed.     • aspirin (ASA) 325 MG TABS Take 325 mg by mouth every day. Take one aspirin daily        No facility-administered encounter medications on file as of 8/16/2018.      ROS     Objective:   BP (!) 91/56   Pulse 88   Ht 1.6 m (5' 3\")   SpO2 (!) 87%     Physical Exam   Constitutional: She appears well-developed and well-nourished.   Neck: No JVD present.   Cardiovascular: Normal rate and regular rhythm.    No murmur heard.  Pulmonary/Chest: Effort normal and breath sounds normal. She has no rales.   Abdominal: Soft. There is no tenderness.   Musculoskeletal: She exhibits no edema.     Myocardial perfusion scan: This showed a large area of anterior and anterior apical infarction.  There was no evidence of ischemia.    Assessment:     1. Ischemic cardiomyopathy     2. Coronary artery disease due to calcified coronary lesion     3. Dyslipidemia     4. Dyspnea on exertion           Medical Decision Making:  Today's Assessment / Status / Plan:     Ms. " Gaurang is clinically stable though she does have difficulty with severe COPD and oxygen dependence.  Her perfusion study shows a large fixed defect which seems somewhat out of proportion to her left ventricular ejection fraction and echocardiogram.  Her echocardiogram did show a apical wall motion abnormality.  Given the fact that she is clinically stable and he is not having any difficulty with chest discomfort, we will continue on medical therapy for now.  Her blood pressure is low and she has no edema.  She will reduce furosemide to 20 mg every other day.  If she has no increase in edema, she will try discontinuing this medication.  She will follow-up in a few months with a repeat echocardiogram.      DIAMOND Jose.  [address] [place] [address]  VIA Facsimile: [phone]

## 2018-10-30 DIAGNOSIS — I25.5 ISCHEMIC CARDIOMYOPATHY: Chronic | ICD-10-CM

## 2018-10-31 RX ORDER — METOPROLOL SUCCINATE 50 MG/1
TABLET, EXTENDED RELEASE ORAL
Qty: 135 TAB | Refills: 1 | Status: SHIPPED | OUTPATIENT
Start: 2018-10-31 | End: 2019-04-22 | Stop reason: SDUPTHER

## 2018-10-31 RX ORDER — LISINOPRIL 20 MG/1
TABLET ORAL
Qty: 90 TAB | Refills: 1 | Status: SHIPPED | OUTPATIENT
Start: 2018-10-31 | End: 2019-04-22 | Stop reason: SDUPTHER

## 2018-12-20 ENCOUNTER — OFFICE VISIT (OUTPATIENT)
Dept: CARDIOLOGY | Facility: CLINIC | Age: 68
End: 2018-12-20
Payer: MEDICARE

## 2018-12-20 VITALS
DIASTOLIC BLOOD PRESSURE: 62 MMHG | HEIGHT: 63 IN | BODY MASS INDEX: 13.82 KG/M2 | OXYGEN SATURATION: 88 % | SYSTOLIC BLOOD PRESSURE: 108 MMHG | WEIGHT: 78 LBS

## 2018-12-20 DIAGNOSIS — R06.09 DYSPNEA ON EXERTION: ICD-10-CM

## 2018-12-20 DIAGNOSIS — I25.5 ISCHEMIC CARDIOMYOPATHY: Chronic | ICD-10-CM

## 2018-12-20 DIAGNOSIS — I25.84 CORONARY ARTERY DISEASE DUE TO CALCIFIED CORONARY LESION: ICD-10-CM

## 2018-12-20 DIAGNOSIS — I25.10 CORONARY ARTERY DISEASE DUE TO CALCIFIED CORONARY LESION: ICD-10-CM

## 2018-12-20 DIAGNOSIS — E78.5 DYSLIPIDEMIA: Chronic | ICD-10-CM

## 2018-12-20 PROCEDURE — 99214 OFFICE O/P EST MOD 30 MIN: CPT | Performed by: INTERNAL MEDICINE

## 2018-12-20 NOTE — PROGRESS NOTES
Chief Complaint   Patient presents with   • Coronary Artery Disease       Subjective:   Aditi Merlos is a 68 y.o. female who presents today for followup of her coronary disease with an ischemic cardiomyopathy which is mild. She did have a prior myocardial infarction in about 2010 with subsequent percutaneous intervention to the diagonal. She also has hyperlipidemia. She previously smoked but has quit this habit. However, she is exposed to significant secondhand smoke.    She is on oxygen therapy 24/7 and has dyspnea on minimal exertion.  She will have dyspnea on activities of daily living such as washing her hair or if she walks 50 feet or less.    She has had no chest discomfort, PND, orthopnea, edema, palpitations or lightheadedness.        Past Medical History:   Diagnosis Date   • Anterior myocardial infarction (HCC)    • CAD (coronary artery disease) 3/27/2013    HX PTCA OF DIAGONAL   • COPD (chronic obstructive pulmonary disease) (HCC)    • Dyslipidemia    • Illiteracy    • Ischemic cardiomyopathy    • Tobacco abuse      Past Surgical History:   Procedure Laterality Date   • APPENDECTOMY       Family History   Problem Relation Age of Onset   • Heart Disease Father      Social History     Social History   • Marital status:      Spouse name: N/A   • Number of children: N/A   • Years of education: N/A     Occupational History   • Not on file.     Social History Main Topics   • Smoking status: Former Smoker     Packs/day: 3.00     Years: 40.00     Types: Cigarettes     Quit date: 6/23/2008   • Smokeless tobacco: Never Used   • Alcohol use No   • Drug use: Unknown   • Sexual activity: Not on file     Other Topics Concern   • Not on file     Social History Narrative   • No narrative on file     No Known Allergies  Outpatient Encounter Prescriptions as of 12/20/2018   Medication Sig Dispense Refill   • lisinopril (PRINIVIL) 20 MG Tab take 1 tablet by mouth once daily 90 Tab 1   • metoprolol SR (TOPROL XL)  "50 MG TABLET SR 24 HR take 1 and 1/2 tablets by mouth once daily 135 Tab 1   • atorvastatin (LIPITOR) 40 MG Tab take 1 tablet by mouth once daily 90 Tab 0   • predniSONE (DELTASONE) 20 MG Tab Take 20 mg by mouth every day.     • ipratropium-albuterol (COMBIVENT RESPIMAT)  MCG/ACT Aero Soln Inhale 1 Puff by mouth 4 times a day.     • furosemide (LASIX) 20 MG Tab Take 1 Tab by mouth 2 times a day. 60 Tab 11   • potassium chloride SA (KDUR) 20 MEQ Tab CR Take 1 Tab by mouth 2 times a day. 60 Tab 11   • Albuterol Sulfate (PROAIR HFA INH) Inhale  by mouth.     • alendronate (FOSAMAX) 70 MG Tab Take  by mouth.  0   • hydrocodone-acetaminophen (NORCO) 5-325 MG TABS per tablet Take 1-2 Tabs by mouth as needed.     • aspirin (ASA) 325 MG TABS Take 325 mg by mouth every day. Take one aspirin daily      • [DISCONTINUED] metoprolol SR (TOPROL XL) 50 MG TABLET SR 24 HR Take 1.5 Tabs by mouth every day. 45 Tab 11   • [DISCONTINUED] lisinopril (PRINIVIL) 20 MG Tab take 1 tablet by mouth once daily 30 Tab 11     No facility-administered encounter medications on file as of 12/20/2018.      ROS     Objective:   /62 (BP Location: Left arm, Patient Position: Sitting, BP Cuff Size: Adult)   Ht 1.6 m (5' 3\")   Wt 35.4 kg (78 lb)   SpO2 88%   BMI 13.82 kg/m²     Physical Exam   Constitutional: She appears well-developed and well-nourished.   Neck: No JVD present.   Cardiovascular: Normal rate and regular rhythm.    No murmur heard.  Pulmonary/Chest: Effort normal and breath sounds normal. She has no rales.   Abdominal: Soft. There is no tenderness.   Musculoskeletal: She exhibits no edema.       Myocardial perfusion scan 8/2018: This showed a large area of anterior and anterior apical infarction.  There was no evidence of ischemia.    Echocardiogram from September 2018: This showed an ejection fraction of 55%.  There was a distal anterior and distal lateral wall motion abnormality extending to the apex.  There was " hypokinesis to akinesis.  There was no significant change from the echocardiogram of 2017.    Assessment:     1. Ischemic cardiomyopathy  EC-ECHOCARDIOGRAM COMPLETE W/O CONT   2. Dyspnea on exertion  EC-ECHOCARDIOGRAM COMPLETE W/O CONT   3. Dyslipidemia     4. Coronary artery disease due to calcified coronary lesion         Medical Decision Making:  Today's Assessment / Status / Plan:     Ms. Merlos is clinically stable but has very poor exercise tolerance.  This is most likely secondary to her underlying lung disease.  However, given her coronary artery disease and history of LV dysfunction, she will be reevaluated with an echocardiogram when she follows up in 6 months.  Her lipid status has been under fairly good control.  We will recheck lab work when she follows up in 6 months..

## 2018-12-20 NOTE — LETTER
[facility] [address] [place] [address]  Phone: [phone]  Fax: [phone]              Aditi Merlos  1950    Encounter Date: 12/20/2018    Darnell Browning M.D.          PROGRESS NOTE:  Chief Complaint   Patient presents with   • Coronary Artery Disease       Subjective:   Aditi Merlos is a 68 y.o. female who presents today for followup of her coronary disease with an ischemic cardiomyopathy which is mild. She did have a prior myocardial infarction in about 2010 with subsequent percutaneous intervention to the diagonal. She also has hyperlipidemia. She previously smoked but has quit this habit. However, she is exposed to significant secondhand smoke.    She is on oxygen therapy 24/7 and has dyspnea on minimal exertion.  She will have dyspnea on activities of daily living such as washing her hair or if she walks 50 feet or less.    She has had no chest discomfort, PND, orthopnea, edema, palpitations or lightheadedness.        Past Medical History:   Diagnosis Date   • Anterior myocardial infarction (HCC)    • CAD (coronary artery disease) 3/27/2013    HX PTCA OF DIAGONAL   • COPD (chronic obstructive pulmonary disease) (HCC)    • Dyslipidemia    • Illiteracy    • Ischemic cardiomyopathy    • Tobacco abuse      Past Surgical History:   Procedure Laterality Date   • APPENDECTOMY       Family History   Problem Relation Age of Onset   • Heart Disease Father      Social History     Social History   • Marital status:      Spouse name: N/A   • Number of children: N/A   • Years of education: N/A     Occupational History   • Not on file.     Social History Main Topics   • Smoking status: Former Smoker     Packs/day: 3.00     Years: 40.00     Types: Cigarettes     Quit date: 6/23/2008   • Smokeless tobacco: Never Used   • Alcohol use No   • Drug use: Unknown   • Sexual activity: Not on file     Other Topics Concern   • Not on file   "    Social History Narrative   • No narrative on file     No Known Allergies  Outpatient Encounter Prescriptions as of 12/20/2018   Medication Sig Dispense Refill   • lisinopril (PRINIVIL) 20 MG Tab take 1 tablet by mouth once daily 90 Tab 1   • metoprolol SR (TOPROL XL) 50 MG TABLET SR 24 HR take 1 and 1/2 tablets by mouth once daily 135 Tab 1   • atorvastatin (LIPITOR) 40 MG Tab take 1 tablet by mouth once daily 90 Tab 0   • predniSONE (DELTASONE) 20 MG Tab Take 20 mg by mouth every day.     • ipratropium-albuterol (COMBIVENT RESPIMAT)  MCG/ACT Aero Soln Inhale 1 Puff by mouth 4 times a day.     • furosemide (LASIX) 20 MG Tab Take 1 Tab by mouth 2 times a day. 60 Tab 11   • potassium chloride SA (KDUR) 20 MEQ Tab CR Take 1 Tab by mouth 2 times a day. 60 Tab 11   • Albuterol Sulfate (PROAIR HFA INH) Inhale  by mouth.     • alendronate (FOSAMAX) 70 MG Tab Take  by mouth.  0   • hydrocodone-acetaminophen (NORCO) 5-325 MG TABS per tablet Take 1-2 Tabs by mouth as needed.     • aspirin (ASA) 325 MG TABS Take 325 mg by mouth every day. Take one aspirin daily      • [DISCONTINUED] metoprolol SR (TOPROL XL) 50 MG TABLET SR 24 HR Take 1.5 Tabs by mouth every day. 45 Tab 11   • [DISCONTINUED] lisinopril (PRINIVIL) 20 MG Tab take 1 tablet by mouth once daily 30 Tab 11     No facility-administered encounter medications on file as of 12/20/2018.      ROS     Objective:   /62 (BP Location: Left arm, Patient Position: Sitting, BP Cuff Size: Adult)   Ht 1.6 m (5' 3\")   Wt 35.4 kg (78 lb)   SpO2 88%   BMI 13.82 kg/m²      Physical Exam   Constitutional: She appears well-developed and well-nourished.   Neck: No JVD present.   Cardiovascular: Normal rate and regular rhythm.    No murmur heard.  Pulmonary/Chest: Effort normal and breath sounds normal. She has no rales.   Abdominal: Soft. There is no tenderness.   Musculoskeletal: She exhibits no edema.       Myocardial perfusion scan 8/2018: This showed a large area " of anterior and anterior apical infarction.  There was no evidence of ischemia.    Echocardiogram from September 2018: This showed an ejection fraction of 55%.  There was a distal anterior and distal lateral wall motion abnormality extending to the apex.  There was hypokinesis to akinesis.  There was no significant change from the echocardiogram of 2017.    Assessment:     1. Ischemic cardiomyopathy  EC-ECHOCARDIOGRAM COMPLETE W/O CONT   2. Dyspnea on exertion  EC-ECHOCARDIOGRAM COMPLETE W/O CONT   3. Dyslipidemia     4. Coronary artery disease due to calcified coronary lesion         Medical Decision Making:  Today's Assessment / Status / Plan:     Ms. Gaurang Elaine, P.A.  [address] [place] [address]  VIA Facsimile: [phone]

## 2019-01-14 ENCOUNTER — TELEPHONE (OUTPATIENT)
Dept: CARDIOLOGY | Facility: MEDICAL CENTER | Age: 69
End: 2019-01-14

## 2019-01-14 NOTE — TELEPHONE ENCOUNTER
Question about stopping aspirin for upcoming surgery   Received: 3 days ago   Message Contents   JAZMYNE Hancock/Erika     Patient is scheduled for cataract surgery on 1/22 and needs to find out about going off her aspirin for 10 days. She wants a call back at [phone].      Returned patient call. Pt given fax # [phone] to have eye surgeon fax clearance request.

## 2019-02-27 ENCOUNTER — HOSPITAL ENCOUNTER (EMERGENCY)
Facility: MEDICAL CENTER | Age: 69
End: 2019-02-27
Attending: EMERGENCY MEDICINE
Payer: MEDICARE

## 2019-02-27 VITALS
HEIGHT: 63 IN | TEMPERATURE: 98.4 F | HEART RATE: 65 BPM | DIASTOLIC BLOOD PRESSURE: 65 MMHG | RESPIRATION RATE: 18 BRPM | BODY MASS INDEX: 15.62 KG/M2 | OXYGEN SATURATION: 100 % | WEIGHT: 88.18 LBS | SYSTOLIC BLOOD PRESSURE: 135 MMHG

## 2019-02-27 DIAGNOSIS — R09.02 HYPOXIA: ICD-10-CM

## 2019-02-27 DIAGNOSIS — R06.03 ACUTE RESPIRATORY DISTRESS: ICD-10-CM

## 2019-02-27 DIAGNOSIS — J43.9 PULMONARY EMPHYSEMA, UNSPECIFIED EMPHYSEMA TYPE (HCC): ICD-10-CM

## 2019-02-27 DIAGNOSIS — Z99.81 DEPENDENCE ON SUPPLEMENTAL OXYGEN: ICD-10-CM

## 2019-02-27 PROCEDURE — 304561 HCHG STAT O2

## 2019-02-27 PROCEDURE — 99284 EMERGENCY DEPT VISIT MOD MDM: CPT

## 2019-02-27 PROCEDURE — 36415 COLL VENOUS BLD VENIPUNCTURE: CPT

## 2019-02-27 NOTE — ED PROVIDER NOTES
ED Provider Note    Scribed for Kirby Abreu M.D. by Santos Preston. 2/27/2019  7:04 AM    Primary care provider: STELLA Jose  Means of arrival: Ambulance  History obtained from: Patient  History limited by: None    CHIEF COMPLAINT  Chief Complaint   Patient presents with   • Shortness of Breath     pt visiting from out of town, has hx of COPD on 3L nasal cannula normally, increased SOB all night, room air sat 79% with EMS, albuerol and duoneb given, now 98% on 3L O2, pt aslo reports she is running low on home O2 supply       HPI  Aditi Merlos is a 68 y.o. female who has a medical history of COPD presents to the Emergency Department via ambulance for sudden shortness of breath onset this morning. Endorses no associated symptoms. She normally uses 3L of oxygen at home, however she recently ran out and started feeling short of breath. She states her symptoms have now improved since receiving oxygen here in the ED. She is from [place] and is in town for an eye operation. She has plenty of oxygen at home, however does not have enough for the trip home. No complaints of chest pain or cough.    REVIEW OF SYSTEMS  Pertinent positives include shortness of breath.   Pertinent negatives include no chest pain or cough.    All other systems reviewed and negative. See HPI for further details.       PAST MEDICAL HISTORY   has a past medical history of Anterior myocardial infarction (HCC); CAD (coronary artery disease) (3/27/2013); COPD (chronic obstructive pulmonary disease) (HCC); Dyslipidemia; Illiteracy; Ischemic cardiomyopathy; and Tobacco abuse.    SURGICAL HISTORY   has a past surgical history that includes appendectomy.    SOCIAL HISTORY  Social History   Substance Use Topics   • Smoking status: Former Smoker     Packs/day: 3.00     Years: 40.00     Types: Cigarettes     Quit date: 6/23/2008   • Smokeless tobacco: Never Used   • Alcohol use No      History   Drug use: Unknown       FAMILY  "HISTORY  Family History   Problem Relation Age of Onset   • Heart Disease Father        CURRENT MEDICATIONS  No current facility-administered medications on file prior to encounter.      Current Outpatient Prescriptions on File Prior to Encounter   Medication Sig Dispense Refill   • lisinopril (PRINIVIL) 20 MG Tab take 1 tablet by mouth once daily 90 Tab 1   • metoprolol SR (TOPROL XL) 50 MG TABLET SR 24 HR take 1 and 1/2 tablets by mouth once daily 135 Tab 1   • atorvastatin (LIPITOR) 40 MG Tab take 1 tablet by mouth once daily 90 Tab 0   • predniSONE (DELTASONE) 20 MG Tab Take 20 mg by mouth every day.     • ipratropium-albuterol (COMBIVENT RESPIMAT)  MCG/ACT Aero Soln Inhale 1 Puff by mouth 4 times a day.     • furosemide (LASIX) 20 MG Tab Take 1 Tab by mouth 2 times a day. 60 Tab 11   • potassium chloride SA (KDUR) 20 MEQ Tab CR Take 1 Tab by mouth 2 times a day. 60 Tab 11   • Albuterol Sulfate (PROAIR HFA INH) Inhale  by mouth.     • alendronate (FOSAMAX) 70 MG Tab Take  by mouth.  0   • hydrocodone-acetaminophen (NORCO) 5-325 MG TABS per tablet Take 1-2 Tabs by mouth as needed.     • aspirin (ASA) 325 MG TABS Take 325 mg by mouth every day. Take one aspirin daily         ALLERGIES  No Known Allergies    PHYSICAL EXAM  VITAL SIGNS: /84   Pulse 84   Temp 36.9 °C (98.4 °F) (Temporal)   Resp 16   Ht 1.6 m (5' 3\")   Wt 40 kg (88 lb 2.9 oz)   SpO2 98%   BMI 15.62 kg/m²     Nursing note and vitals reviewed.  Constitutional: Well-developed and well-nourished. No distress. Chronically ill-appearing.  HENT: Head is normocephalic and atraumatic. Oropharynx is clear and moist without exudate or erythema.   Eyes: Pupils are equal, round, and reactive to light. Conjunctiva are normal.   Cardiovascular: Normal rate and regular rhythm. No murmur heard. Normal radial pulses.  Pulmonary/Chest: Breath sounds normal. No wheezes or rales. Stigmata of COPD.  Abdominal: Soft and non-tender. No distention  "   Musculoskeletal: Extremities exhibit normal range of motion without edema or tenderness.   Neurological: Awake, alert and oriented to person, place, and time. No focal deficits noted.  Skin: Skin is warm and dry. No rash.   Psychiatric: Normal mood and affect. Appropriate for clinical situation.    COURSE & MEDICAL DECISION MAKING  Nursing notes, VS, PMSFHx reviewed in chart.     Review of past medical records shows the patient was admitted here 5 years ago after experiencing an acute MI.      7:04 AM - Patient seen and examined at bedside. She is currently using 3L of oxygen. Will consult social work to provide oxygen for patient.     7:44 AM Social work will provide oxygen for patient.     HTN/IDDM FOLLOW UP:  The patient is referred to a primary physician for blood pressure management, diabetic screening, and for all other preventive health concerns    The patient will return for new or worsening symptoms and is stable at the time of discharge.    The patient is referred to a primary physician for blood pressure management, diabetic screening, and for all other preventative health concerns.    DISPOSITION:  Patient will be discharged home in stable condition.    FOLLOW UP:  STELLA Jose  [address] [place] [address]  [phone]      If symptoms worsen    [facility], Emergency Dept  [address] [facility] [address] [facility] [address] [phone]  Schedule an appointment as soon as possible for a visit       FINAL IMPRESSION  1. Acute respiratory distress    2. Hypoxia    3. Pulmonary emphysema, unspecified emphysema type (HCC)    4. Dependence on supplemental oxygen          Santos PAK), am scribing for, and in the presence of, Kirby Abreu M.D..    Electronically signed by: Santos Dueñas), 2/27/2019    Kirby PAK M.D. personally performed the services described in this documentation, as scribed by Santos Preston in my presence, and it is  both accurate and complete. E.     The note accurately reflects work and decisions made by me.  Kirby Abreu  2/27/2019  11:47 AM

## 2019-02-27 NOTE — ED NOTES
All lines and monitors disconnected.  Discharge instructions reviewed, questions answered.  Pt(using new oxygen tank) to lobby via wheelchair, escorted by RN.  Pt states all belongings in possession.

## 2019-02-27 NOTE — ED NOTES
Report received from Isabell MONTILLA, assumed care of patient.  White board updated, POC discussed.  Call light and belongings within reach.  Bed in lowest position.

## 2019-02-27 NOTE — ED NOTES
Representative from [facility] at bedside - oxygen tank delivery and instruction.

## 2019-03-16 DIAGNOSIS — E78.5 DYSLIPIDEMIA: ICD-10-CM

## 2019-03-18 RX ORDER — ATORVASTATIN CALCIUM 40 MG/1
TABLET, FILM COATED ORAL
Qty: 90 TAB | Refills: 1 | Status: SHIPPED | OUTPATIENT
Start: 2019-03-18 | End: 2019-09-07 | Stop reason: SDUPTHER

## 2019-04-22 DIAGNOSIS — I25.5 ISCHEMIC CARDIOMYOPATHY: Chronic | ICD-10-CM

## 2019-04-24 RX ORDER — METOPROLOL SUCCINATE 50 MG/1
TABLET, EXTENDED RELEASE ORAL
Qty: 135 TAB | Refills: 1 | Status: SHIPPED | OUTPATIENT
Start: 2019-04-24

## 2019-04-24 RX ORDER — LISINOPRIL 20 MG/1
TABLET ORAL
Qty: 90 TAB | Refills: 1 | Status: SHIPPED | OUTPATIENT
Start: 2019-04-24

## 2019-09-07 DIAGNOSIS — E78.5 DYSLIPIDEMIA: ICD-10-CM

## 2019-09-09 RX ORDER — ATORVASTATIN CALCIUM 40 MG/1
TABLET, FILM COATED ORAL
Qty: 90 TAB | Refills: 1 | Status: SHIPPED | OUTPATIENT
Start: 2019-09-09

## 2023-09-07 NOTE — LETTER
[facility] [address] [place] [address]  Phone: [phone]  Fax: [phone]              Aditi Merlos  1950    Encounter Date: 6/28/2018    Darnell Browning M.D.          PROGRESS NOTE:  Chief Complaint   Patient presents with   • Coronary Artery Disease       Subjective:   Aditi Merlos is a 67 y.o. female who presents today for followup of her coronary disease with an ischemic cardiomyopathy which is mild. She did have a prior myocardial infarction with subsequent percutaneous intervention to the diagonal. She also has hyperlipidemia. She previously smoked but has quit this habit. However, she is exposed to significant secondhand smoke.    She was in the emergency room about 6 a days ago for dyspnea and a bronchitis.  At that time, she has significant EKG abnormalities with T wave inversion anteriorly.  It was felt that this might be new.  Unfortunately, we did not have an old EKG for review.  She had no associated chest discomfort. And her troponins were negative.    She did have some difficulty with lightheadedness which resolved after she reduced her diuretic therapy to 1 tablet daily.  She has dyspnea on exertion at less than 20-30 feet.  She is on oxygen 24/7 and has no dyspnea at night as long as she is on oxygen therapy.  She is noted no edema, palpitations or chest discomfort.                Past Medical History:   Diagnosis Date   • Anterior myocardial infarction (HCC)    • CAD (coronary artery disease) 3/27/2013    HX PTCA OF DIAGONAL   • COPD (chronic obstructive pulmonary disease) (HCC)    • Dyslipidemia    • Illiteracy    • Ischemic cardiomyopathy    • Tobacco abuse      Past Surgical History:   Procedure Laterality Date   • APPENDECTOMY       Family History   Problem Relation Age of Onset   • Heart Disease Father      Social History     Social History   • Marital status:      Spouse name: N/A   • Number of  BEDSIDE PFT by RT      Patient YOB: 1978    Patient age: 44    Patient gender: Female    Patient height: 170.2cm          Predicted values:     FEV1: 2.99    FVC: 3.47    PEF: 6.89    FEV1/FVC: 80.6    Patient values:    FEV1: 3.66    FVC: 4.26    PEF: 8.76    FEV1/FVC: 85.9          Patient effort:  Vasiliy Mendoza, RRT       "children: N/A   • Years of education: N/A     Occupational History   • Not on file.     Social History Main Topics   • Smoking status: Former Smoker     Packs/day: 3.00     Years: 40.00     Types: Cigarettes     Quit date: 6/23/2008   • Smokeless tobacco: Never Used   • Alcohol use No   • Drug use: Unknown   • Sexual activity: Not on file     Other Topics Concern   • Not on file     Social History Narrative   • No narrative on file     No Known Allergies  Outpatient Encounter Prescriptions as of 6/28/2018   Medication Sig Dispense Refill   • predniSONE (DELTASONE) 20 MG Tab Take 20 mg by mouth every day.     • metoprolol SR (TOPROL XL) 50 MG TABLET SR 24 HR Take 1.5 Tabs by mouth every day. 45 Tab 11   • lisinopril (PRINIVIL) 20 MG Tab take 1 tablet by mouth once daily 30 Tab 11   • ipratropium-albuterol (COMBIVENT RESPIMAT)  MCG/ACT Aero Soln Inhale 1 Puff by mouth 4 times a day.     • furosemide (LASIX) 20 MG Tab Take 1 Tab by mouth 2 times a day. 60 Tab 11   • potassium chloride SA (KDUR) 20 MEQ Tab CR Take 1 Tab by mouth 2 times a day. 60 Tab 11   • atorvastatin (LIPITOR) 40 MG Tab Take 1 Tab by mouth every day. 90 Tab 2   • Albuterol Sulfate (PROAIR HFA INH) Inhale  by mouth.     • hydrocodone-acetaminophen (NORCO) 5-325 MG TABS per tablet Take 1-2 Tabs by mouth as needed.     • aspirin (ASA) 325 MG TABS Take 325 mg by mouth every day. Take one aspirin daily      • alendronate (FOSAMAX) 70 MG Tab Take  by mouth.  0     No facility-administered encounter medications on file as of 6/28/2018.      ROS     Objective:   BP (!) 96/58   Pulse 84   Ht 1.575 m (5' 2\")   Wt 37.2 kg (82 lb)   SpO2 (!) 87%   BMI 15.00 kg/m²      Physical Exam   Constitutional: She appears well-developed and well-nourished.   Neck: No JVD present.   Cardiovascular: Normal rate and regular rhythm.    No murmur heard.  Pulmonary/Chest: Effort normal and breath sounds normal. She has no rales.   Abdominal: Soft. There is no " tenderness.   Musculoskeletal: She exhibits no edema.     Views from June 22 reviewed by myself: These show an anterior infarction with T wave inversion in V3 through V5 and loss of R wave from V1 through V4.    Assessment:     1. Coronary artery disease due to calcified coronary lesion     2. Ischemic cardiomyopathy     3. Dyslipidemia     4. Dyspnea on exertion         Medical Decision Making:  Today's Assessment / Status / Plan:     Ms. Merlos is clinically stable though she does have chronic difficulty with dyspnea from her underlying COPD.  She did have lab work at the hospital last week and will obtain that for review.  Given her abnormal EKGs, we will have her undergo myocardial perfusion imaging to reevaluate for ischemia.  She does have a remote history of a diagonal stent.  She will follow-up in about a month.  If she has not had a lipid panel, one will be obtained.      DIAMOND Jose.  [address] [place] [address]  VIA Facsimile: [phone]